# Patient Record
Sex: FEMALE | Race: BLACK OR AFRICAN AMERICAN | NOT HISPANIC OR LATINO | Employment: STUDENT | URBAN - METROPOLITAN AREA
[De-identification: names, ages, dates, MRNs, and addresses within clinical notes are randomized per-mention and may not be internally consistent; named-entity substitution may affect disease eponyms.]

---

## 2024-06-03 ENCOUNTER — OFFICE VISIT (OUTPATIENT)
Dept: URGENT CARE | Facility: CLINIC | Age: 19
End: 2024-06-03
Payer: COMMERCIAL

## 2024-06-03 DIAGNOSIS — Z13.0 ENCOUNTER FOR SICKLE-CELL SCREENING: Primary | ICD-10-CM

## 2024-06-03 PROCEDURE — 99499 UNLISTED E&M SERVICE: CPT | Mod: S$GLB,,, | Performed by: NURSE PRACTITIONER

## 2024-06-03 PROCEDURE — 85660 RBC SICKLE CELL TEST: CPT | Performed by: NURSE PRACTITIONER

## 2024-06-03 NOTE — PROGRESS NOTES
Patient is here for sickle cell screening. 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

## 2024-06-04 LAB — HGB S BLD QL SOLY: NEGATIVE

## 2024-06-06 ENCOUNTER — TELEPHONE (OUTPATIENT)
Dept: URGENT CARE | Facility: CLINIC | Age: 19
End: 2024-06-06
Payer: COMMERCIAL

## 2024-06-06 NOTE — TELEPHONE ENCOUNTER
Informed student her Sickle Cell lab is NEG.   has come to  lab results and attached to [employer] forms.  Student verbalized understanding.  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

## 2024-10-15 ENCOUNTER — OFFICE VISIT (OUTPATIENT)
Dept: SPORTS MEDICINE | Facility: CLINIC | Age: 19
End: 2024-10-15
Payer: COMMERCIAL

## 2024-10-15 ENCOUNTER — ATHLETIC TRAINING SESSION (OUTPATIENT)
Dept: SPORTS MEDICINE | Facility: CLINIC | Age: 19
End: 2024-10-15
Payer: COMMERCIAL

## 2024-10-15 VITALS
WEIGHT: 150 LBS | DIASTOLIC BLOOD PRESSURE: 72 MMHG | BODY MASS INDEX: 26.58 KG/M2 | HEIGHT: 63 IN | SYSTOLIC BLOOD PRESSURE: 122 MMHG

## 2024-10-15 DIAGNOSIS — S06.0X0A CONCUSSION WITHOUT LOSS OF CONSCIOUSNESS, INITIAL ENCOUNTER: Primary | ICD-10-CM

## 2024-10-15 DIAGNOSIS — S09.90XA MINOR HEAD INJURY WITHOUT LOSS OF CONSCIOUSNESS, INITIAL ENCOUNTER: Primary | ICD-10-CM

## 2024-10-15 PROCEDURE — 99999 PR PBB SHADOW E&M-EST. PATIENT-LVL III: CPT | Mod: PBBFAC,,, | Performed by: ORTHOPAEDIC SURGERY

## 2024-10-15 PROCEDURE — 99204 OFFICE O/P NEW MOD 45 MIN: CPT | Mod: 25,S$GLB,, | Performed by: ORTHOPAEDIC SURGERY

## 2024-10-15 PROCEDURE — 96132 NRPSYC TST EVAL PHYS/QHP 1ST: CPT | Mod: S$GLB,,, | Performed by: ORTHOPAEDIC SURGERY

## 2024-10-15 NOTE — PROGRESS NOTES
"Reason for Encounter New Injury    Subjective:       Chief Complaint: Dipti Saunders is a 19 y.o. female student at [employer] who had concerns including Concussion w/o Loc.    Dipti was practicing and went head to head with another player. She did not lose consciousness. She took a minute on the floor and got up on her own. She was alert and oriented. She did not go back into practice. A symptom scale and SCAT5 were done after practice.     Handedness: right-handed  Sport played: basketball      Level: college                ROS              Objective:       General: Dipti is well-developed, well-nourished, appears stated age, in no acute distress, alert and oriented to time, place and person.     AT Session          Assessment:     Status: O - Out    Date Seen:  10/15/2024    Date of Injury:  10/15/2024    Date Out:  10/15/2024    Date Cleared:  n/a        Treatment/Rehab/Maintenance:     Date of Evaluation: 10/15/2024    : Alyssa Reyes    Date of Concussion: 10/15/2024    HPI: Dipti Saunders is being seen in the athletic training room for concussion testing. A SCAT was performed in the ATR and will be uploaded.      Symptom Evaluation  0-6   Headache 2   "Pressure in head" 2   Neck pain  0   Nausea or vomiting 1   Dizziness 4   Blurred vision 0   Balance problems 0   Sensitivity to light 1   Sensitivity to noise  0   Feeling slowed down 2   Feeling like "in a fog" 3   "Don't feel right" 2   Difficulty concentrating 0   Difficulty remembering  0   Fatigue or low energy 1   Confusion  1   Drowsiness 1   More emotional 0   Irritability  0   Sadness 0   Nervous or Anxious 0   Trouble falling asleep 0         Total # of symptoms 11/22   Symptom severity score 20/132       Modified Balance Error Scoring System (mBESS) testing:    Non-dominant foot: left   Testing surface: Hard floor, shoes on        Number of Errors   Double Leg Stance 0   Single Leg Stance 1   Tandem Stance 1   Total Errors 2 "         Assessment:  Concussion      Plan:  Referral        Plan:       1. Referral  2. Physician Referral: yes  3. ED Referral:no  4. Parent/Guardian Notified: No  5. All questions were answered, ath. will contact me for questions or concerns in  the interim.  6.         Eligible to use School Insurance: Yes

## 2024-10-15 NOTE — PROGRESS NOTES
"Subjective:     Dipti, a 19 y.o. female is here today for evaluation of a closed head injury. DOI: 10/15/2024. No LOC from event. 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 She states she collided with a male practice player hitting her forehead against his head. She admits to immediately feeling dizzy and appreciating a headache affecting where she hit her head. She states she is feeling 90% of her normal self.     School / grade: [employer] / [employer]  Sport: Basketball  Position: PG  Dominant hand: Right  How many concussions have you have in the past? 0  When was your most recent concussion & how long was recovery? NA  Have you ever been hospitalized or had medical imaging done for a head injury? NA  Have you ever been diagnosed with headaches or migraines? No  Do you have a learning disability / dyslexia? No  Do you have ADD/ADHD? No  Have you been diagnosed with depression, anxiety or other psychiatric disorder? No  Do you have a history of motion sickness? No  Do you have a history of syncope? No  Do you have a history of epilepsy/seizures? No  Do you wear glasses or contacts? No   If so, when was your last vision exam? NA  Have you taken a baseline ImPACT examination? Yes      Symptom Evaluation  0-6   Headache 1   "Pressure in head" 1   Neck pain  0   Nausea or vomiting 0   Dizziness 2   Blurred vision 0   Balance problems 0   Sensitivity to light 0   Sensitivity to noise  0   Feeling slowed down 0   Feeling like "in a fog" 0   "Don't feel right" 1   Difficulty concentrating 0   Difficulty remembering  0   Fatigue or low energy 0   Confusion  0   Drowsiness 0   More emotional 0   Irritability  0   Sadness 0   Nervous or Anxious 0   Trouble falling asleep 0         Total # of symptoms 4/22   Symptom severity score 5/132     HPI template based on:  1) Consensus statement on concussion in sport--the 5th international conference on concussion in sport held " "in [place], October 2016  2) Sport concussion assessment tool--5th edition    PAST MEDICAL HISTORY:  History reviewed. No pertinent past medical history.    SURGICAL HISTORY:  History reviewed. No pertinent surgical history.    FAMILY HISTORY:  No family history on file.    SOCIAL HISTORY:   reports that she has never smoked. She has never used smokeless tobacco. No history on file for alcohol use and drug use.    MEDICATIONS:  No current outpatient medications on file.    ALLERGIES:  Review of patient's allergies indicates:   Allergen Reactions    Nuts [tree nut]        Objective:     PHYSICAL EXAMINATION:  /72   Ht 5' 3" (1.6 m)   Wt 68 kg (150 lb)   BMI 26.57 kg/m²   Vitals signs and nursing note have been reviewed.  General: In no acute distress, well developed, well nourished, no diaphoresis  Eyes: no eye redness or discharge  HENT: normocephalic and atraumatic, neck supple, trachea midline, no nasal discharge, no external ear redness or discharge. No evidence of dirk orbital raccoon sign to suggest orbital fracture or mastoid process juarez sign to suggest basilar skull fracture on observation. No significant pain with cranial compression to suggest skull fracture upon palpation.   Cardiovascular: 2+ and symmetric radial and DP pulses bilaterally, no LE edema  Lungs: respirations non-labored, no conversational dyspnea   Abd: non-distended, no rigidity  Skin: No rashes, warm and dry  Psychiatric: cooperative, pleasant, mood and affect appropriate for age    NEURO EXAM:  Sensation to light touch intact for UE and LE dermatomes  CN II-XII intact suggesting no intracranial hemorrhage  Upper limb and lower limb coordination: normal  Finger-to-nose testing: appropriate      DTR's                          1. Biceps (C5)   2+/4  2. Brachioradialis (C6) 2+/4  3. Triceps (C7)  2+/4  4. Patella (L4)   2+/4  5. Achilles (L5)  2+/4    Strength Testing: ('*' = with pain)           Upper Extremity  Deltoid        "                             5/5 B/L  Biceps               5/5 B/L  Triceps               5/5 B/L  Wrist Flexion   5/5 B/L  Wrist Extension  5/5 B/L      5/5 B/L  Finger ABduction  5/5 B/L  Finger ABduction  5/5 B/L  EPL (Ext. pollicis longus) 5/5 B/L  Pinch Mechanism  5/5 B/L    Lower Extremity  Hip Flexion   5/5 B/L  Hamstrings   5/5 B/L  Quadraceps              5/5 B/L  Ankle Dorsiflexion  5/5 B/L  Ankle Plantarflexion  5/5 B/L  Ankle Inversion  5/5 B/L  Ankle Eversion  5/5 B/L  EHL (Ext. hollicis longus) 5/5 B/L       Special Tests:                          Spurling's  Negative B/L  Seated SLR  Negative B/L    Modified Balance Error Scoring System (mBESS) testing:    Non-dominant foot: left   Testing surface: Hard floor, shoes on   Number of Errors   Double Leg Stance 0     Single Leg Stance 0     Tandem Stance 0     Total Errors 0       Vestibular/Ocular-Motor Screening (VOMS) Testing:   Headache Dizziness Nausea Fogginess Comments   Symptom severity prior to test 3   1   0   0   No data recorded   VOM Test        Smooth Pursuits 3   1   0   0   No data recorded   Saccades - Horizontal 3   1   0   0   No data recorded   Saccades - Vertical 3   1   0   0   No data recorded   Congergence 3   1   0   0   Measurements (cm):    3 cm, 3 cm, 3 cm     VOR - Horizontal 3   1   0   0   No data recorded   VOR - Vertical 3   1   0   0   No data recorded   Visual Motion Sensitivity Test 3   1   0   0   No data recorded     MUSCULOSKELETAL EXAM:    Posture:  Upright  Neck examination:  Range of motion: Normal  Tenderness: None    Assessment:     Encounter Diagnosis   Name Primary?    Minor head injury without loss of consciousness, initial encounter Yes     Minor head injury w/out loss of consciousness  No evidence of myelopathy / spinal cord pathology  No evidence of focal neurologic deficit  No evidence of skull fracture    Plan:     1) Reassuring evaluation, minimal symptoms present.    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 She endorses immediately feeling dizzy, and headache at the point of impact. She states she is currently feeling 90% of her normal self and feels as though she could have continued to play.     - Education provided on concussions being evolving injuries. We need to monitor for the next 24-48 hours to ensure that symptoms do not worsen. Her symptoms did not worsen with ImPACT testing which is also reassuring that she did not suffer a concussion.     Yes (+) or No (-) Comments   Neuropsychological testing     Administered, reviewed, and shared with the patient (and family, if present) at this visit. + 60 MINUTES FOR: The examination component, including combining data from different sources, interpreting test results and clinical data, decision-making, providing a plan of treatment report, as well as providing interactive feedback with the patient and family members or caregivers    Mental activity     School attendance allowed? +    w/ concussion accommodations? -    Social activity     In person, telephone, and text interactions limited? +    Physical activity (e.g. sports, work)     sports participation prohibited? -    Clinic contact w/  today to discuss plan? + School: [employer]  : [employer]       2) Education:   - Education provided on the diagnosis of concussion. We reviewed the signs and symptoms of concussion, current knowledge on concussions, and the importance of brain and physical rest until symptom resolution. Once symptoms are improving/improved, a progressive return to activity under daily guidance is initiated. We discussed second impact syndrome, that all concussions are significant, and that we cannot predict when one will result in residual symptoms or the development of sequelae later in life. We also reviewed that concussions also often are a  whiplash event that can have mechanical head, neck, and upper back symptoms that may improve/resolve with osteopathic manipulative treatment. I advised that concussion is a clinical diagnosis and we take into account many factors including mechanism of injury, symptoms and symptom severity, and physical examination focusing on the neurologic and visual symptoms.    3) Follow up in 1 week or sooner for re evaluation should patient's symptoms COMPLETELY resolve  -  upon successful completion of RTP protocol per SCAT5, pt/family/AT will contact the clinic, and the clinic will document successful completion  - if any Step of RTP protocol per SCAT5 is failed, pt/family/AT will immediately alert the clinic for further evaluation    - Should symptoms acutely worsen, or should new symptoms arise, the patient should call the clinic, but if unavailable immediately present to the Emergency Department for further evaluation.    4) Patient and parent/family/ATC agreeable to today's plan and all questions were answered      This note is dictated using the M*Modal Fluency Direct word recognition program. There are word recognition mistakes that are occasionally missed on review.

## 2024-10-19 ENCOUNTER — ATHLETIC TRAINING SESSION (OUTPATIENT)
Dept: SPORTS MEDICINE | Facility: CLINIC | Age: 19
End: 2024-10-19
Payer: COMMERCIAL

## 2024-10-19 DIAGNOSIS — S09.90XA MINOR HEAD INJURY WITHOUT LOSS OF CONSCIOUSNESS, INITIAL ENCOUNTER: Primary | ICD-10-CM

## 2024-10-19 NOTE — PROGRESS NOTES
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    Dipti returns to the ATR to report her symptoms. She states that she is feeling fine.     Handedness: right-handed  Sport played: basketball      Level: college            Head Injury        ROS              Objective:       General: Dipti is well-developed, well-nourished, appears stated age, in no acute distress, alert and oriented to time, place and person.     AT Session          Assessment:     Status: L - Limited    Date Seen:  10/16/2024    Date of Injury:  10/15/2024    Date Out:  10/15/2024    Date Cleared:  n/a        Treatment/Rehab/Maintenance:     Date of Evaluation: 10/17/2024    : Alyssa Reyes    Date of Head Injury: 10/15/2024    HPI: Dipti Saunders is being seen in the athletic training room for concussion testing. She is doing better. This is her prepractice symptom score. She participated in a full contact practice without any issues. This is her prepractice symptom score.    Symptom Evaluation  0-6   Headache 2   "Pressure in head" 1   Neck pain  0   Nausea or vomiting 0   Dizziness 0   Blurred vision 0   Balance problems 0   Sensitivity to light 1   Sensitivity to noise  2   Feeling slowed down 0   Feeling like "in a fog" 0   "Don't feel right" 1   Difficulty concentrating 0   Difficulty remembering  0   Fatigue or low energy 0   Confusion  0   Drowsiness 0   More emotional 0   Irritability  0   Sadness 1   Nervous or Anxious 0   Trouble falling asleep 2         Total # of symptoms 7/22   Symptom severity score 10/132     Assessment:  Minor head injury, no conussion      Plan:  Continue full practice. Report back if any symptoms reappear or worsen. Otherwise cleared to exert.    Date of Evaluation: 10/16/2024    : Alyssa Reyes    Date of Head Injury: 10/15/2024    HPI: Dipti Saunders is being seen " "in the athletic training room for concussion testing. She is doing better. This is her prepractice symptom score. She performed 20-30 mins on the bike and shooting, non-contact drills.       Symptom Evaluation  0-6   Headache 3   "Pressure in head" 2   Neck pain  0   Nausea or vomiting 0   Dizziness 0   Blurred vision 0   Balance problems 0   Sensitivity to light 1   Sensitivity to noise  3   Feeling slowed down 0   Feeling like "in a fog" 0   "Don't feel right" 2   Difficulty concentrating 0   Difficulty remembering  0   Fatigue or low energy 0   Confusion  0   Drowsiness 0   More emotional 0   Irritability  0   Sadness 0   Nervous or Anxious 0   Trouble falling asleep 3         Total # of symptoms 6/22   Symptom severity score 14/132     Post-practice symptom score -       Symptom Evaluation  0-6   Headache 3   "Pressure in head" 1   Neck pain  0   Nausea or vomiting 0   Dizziness 0   Blurred vision 0   Balance problems 0   Sensitivity to light 1   Sensitivity to noise  3   Feeling slowed down 0   Feeling like "in a fog" 0   "Don't feel right" 2   Difficulty concentrating 0   Difficulty remembering  0   Fatigue or low energy 0   Confusion  0   Drowsiness 0   More emotional 0   Irritability  1   Sadness 1   Nervous or Anxious 0   Trouble falling asleep 3         Total # of symptoms 8/22   Symptom severity score 15/132         Assessment:  Minor head injury      Plan:  Continue to full practice tomorrow if feeling better.        Plan:       1. Symptom scores, follow up with Dr. Lynn and progress as tolerated.  2. Physician Referral: no  3. ED Referral:no  4. Parent/Guardian Notified: No  5. All questions were answered, ath. will contact me for questions or concerns in  the interim.  6.         Eligible to use School Insurance: Yes                  "

## 2024-11-14 ENCOUNTER — ATHLETIC TRAINING SESSION (OUTPATIENT)
Dept: SPORTS MEDICINE | Facility: CLINIC | Age: 19
End: 2024-11-14
Payer: COMMERCIAL

## 2024-11-14 DIAGNOSIS — Z00.00 HEALTHCARE MAINTENANCE: Primary | ICD-10-CM

## 2024-11-14 NOTE — PROGRESS NOTES
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    During the game vs David Sanchez.11/12/2024, on a drive to the basket as Dipti went up for the lay-up the opponent went to block her shot. On the block, the opponent was able to force her back into hyperextension. She finished her possessions until she was sub out of the game. During halftime is when she report the injury to me and stated that it was just a bad pain in her lower back. For treatments, I actively stretch her hamstrings and lower back and did a light massage o the lower back while she was in a stretching position.  She hasn't stated any problem about it since that day. She did finish the game.    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 The current episode started in the past 7 days. The problem occurs rarely. The problem has been resolved. The treatment provided significant relief.       ROS              Objective:       General: Dipti is well-developed, well-nourished, appears stated age, in no acute distress, alert and oriented to time, place and person.     AT Session          Assessment:     Status: F - Full Participation    Date Seen:  11/12/2024    Date of Injury:  11/12/2024    Date Out:  11/12/2024    Date Cleared:  11/12/2024        Treatment/Rehab/Maintenance:   Dipti completed:    [x]  INJURY TREATMENT   []  MAINTENANCE  DATE OF SERVICE: 11/12/2024  INJURY/CONDITON: Lower back    Dipti received the selected modalities after being cleared for contradictions.  Dipti received education on potenital side effects of the selected modalities and agreed to treatment.      Massage Duration  (Mins) Add. Tx Parameters / Comment   []Massage - IASTM     []Massage - Scar Tissue     []Massage - Self Administered     [x]Massage -  Therapeutic 5 W/stretch   []Myofascial Release        THERAPEUTIC EXERCISES:    Stretching Cardio Rehab Other   []Stretching - Active []Cardio - Bike []Rehab - Ankle/Foot []Agility []PNF   []Stretching - Dynamic []Cardio - Elliptical []Rehab - Knee []Balance []ROM - Active   [x]Stretching - Passive []Cardio - Jog/Run []Rehab - Hip []Blood Flow Restriction []ROM - Passive   []Stretching - PNF []Cardio - Treadmill []Rehab - Wrist/Hand []Foam Roller []RTP - Concussion Protocol   []Stretching - Static []Cardio - Upper Body Ergometer []Rehab - Elbow []Functional Exercises []RTP - Sport Specific    []Cardio - Walk []Rehab - Shoulder []Joint Mobilization []Strengthening Exercises     []Rehab - Neck/Spine []Manual Therapy []Other:     []Rehab - Back []Plyometric Exercises      []Rehab - Other         Plan:       1. Check back in with me and get treatment as needed.  2. Physician Referral: no  3. ED Referral:no  4. Parent/Guardian Notified: No  5. All questions were answered, ath. will contact me for questions or concerns in  the interim.  6.         Eligible to use School Insurance: Yes

## 2024-11-30 ENCOUNTER — ATHLETIC TRAINING SESSION (OUTPATIENT)
Dept: SPORTS MEDICINE | Facility: CLINIC | Age: 19
End: 2024-11-30
Payer: COMMERCIAL

## 2024-11-30 DIAGNOSIS — Z00.00 HEALTHCARE MAINTENANCE: Primary | ICD-10-CM

## 2024-12-01 NOTE — PROGRESS NOTES
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    She hasn't complain about it since 11/19/2024 but comes to get treatment on it every so often especially on or before game days.     During Practice on 11/17/2024, as they were doing a lay-up drill, Gomez drove to the goal and when she went up for the shoot, she barely got off the ground and immediately fell to the ground. She walked it off and jumped right back in practice. She finish practice without a problem. On 11/18/2024 she reports in the training room complaining of R ankle pain, She could barely walk on it and stated that pain scale is 7/10. I taped her up for practice but she only did shooting drills and walk-through and after practice she came and received treatment on it. On 11/19/224 she reports to morning treatments before the game feeling better that yesterday and ready to go full out. She played in the game without a problem..     Handedness: right-handed  Sport played: basketball      Level: college      Position:gaurd      Injury  This is a new problem. The current episode started 1 to 4 weeks ago. The problem occurs every several days. The problem has been gradually improving. The symptoms are aggravated by walking. She has tried ice and NSAIDs for the symptoms. The treatment provided moderate relief.   Pain  This is a new problem. The current episode started 1 to 4 weeks ago. The problem occurs every several days. The problem has been gradually improving. The symptoms are aggravated by walking. She has tried ice for the symptoms. The treatment provided moderate relief.       ROS              Objective:       General: Dipti is well-developed, well-nourished, appears stated age, in no acute distress, alert and oriented to time, place and person.     AT Session          Assessment:     Status: F - Full  Participation    Date Seen:  11/17/2024    Date of Injury:  11/17/2024    Date Out:  n/a    Date Cleared:  11/17/2024        Treatment/Rehab/Maintenance:   Dipti completed:    [x]  INJURY TREATMENT   []  MAINTENANCE  DATE OF SERVICE: 11/19/2024  INJURY/CONDITON: R ankle     Dipti received the selected modalities after being cleared for contradictions.  Dipti received education on potenital side effects of the selected modalities and agreed to treatment.    Ultrasound Duty Cycle   (%) Freq.  (Mhz) Intensity   (w/cm2) Duration  (Mins) Add. Tx Parameters / Comment   []Combo        []Phonophoresis     Meds:   [x]Ultrasound  50 3.3 1.0 7 W/ biofreeze & cajun cream   []Ultrasound and E-Stim            Miscellaneous Add. Tx Parameters / Comment   []Compression Wrap    []Support Wrap    [x]Taping - Preventative L ankle   [x]Taping - Injured Part R ankle   []Wound Care    []Other:      Comment:         [x]  INJURY TREATMENT   []  MAINTENANCE  DATE OF SERVICE: 11/18/2024  INJURY/CONDITON: R ankle    MODALITIES:    Cryotherapy / Thermotherapy Duration  (Mins) Add. Tx Parameters / Comment   []Cold Tub / Whirlpool (50-60 F)     []Contrast Bath (105-110 F & 50-65 F)     [x]Game Ready 20 W/e-stim   []Hot Pack     []Hot Tub / Whirlpool ( F)     []Ice Massage     []Ice Pack     []Paraffin Wax (126-130 F)     []Vapocoolant Spray        Comment:       Electrotherapy Waveform   (AC/DC) Modulation (Cont./Interrupted/Surged) Intensity   (V) Pulse Width/Dur.  (uS) Pulse Rate/Freq.  (Hz, PPS or CPS) Duration  (Mins) Add. Tx Parameters / Comment   []Combo          [x]E-Stim - IFC DC Cont 17   20 W/ gameready   []E-Stim - Premod          []E-Stim - [place]ese          []E-Stim - TENS          []E-Stim - Other          []Iontophoresis        Meds:           Plan:       1. Rest and come from treatments as needed.  2. Physician Referral: no  3. ED Referral:no  4. Parent/Guardian Notified: No  5. All questions were answered, ath.  will contact me for questions or concerns in  the interim.  6.         Eligible to use School Insurance: Yes

## 2024-12-12 ENCOUNTER — OFFICE VISIT (OUTPATIENT)
Dept: URGENT CARE | Facility: CLINIC | Age: 19
End: 2024-12-12
Payer: COMMERCIAL

## 2024-12-12 VITALS
SYSTOLIC BLOOD PRESSURE: 113 MMHG | HEART RATE: 87 BPM | WEIGHT: 150 LBS | OXYGEN SATURATION: 97 % | TEMPERATURE: 100 F | BODY MASS INDEX: 26.58 KG/M2 | DIASTOLIC BLOOD PRESSURE: 75 MMHG | HEIGHT: 63 IN | RESPIRATION RATE: 18 BRPM

## 2024-12-12 DIAGNOSIS — J06.9 VIRAL URI WITH COUGH: Primary | ICD-10-CM

## 2024-12-12 DIAGNOSIS — R05.9 COUGH, UNSPECIFIED TYPE: ICD-10-CM

## 2024-12-12 LAB
CTP QC/QA: YES
SARS-COV-2 RDRP RESP QL NAA+PROBE: NEGATIVE

## 2024-12-12 NOTE — PROGRESS NOTES
"Subjective:      Patient ID: Dipti Saunders is a 19 y.o. female.    Vitals:  height is 5' 3" (1.6 m) and weight is 68 kg (150 lb). Her oral temperature is 99.5 °F (37.5 °C). Her blood pressure is 113/75 and her pulse is 87. Her respiration is 18 and oxygen saturation is 97%.     Chief Complaint: Cough    (Student)    Cough  This is a new problem. The current episode started yesterday. The problem has been gradually worsening. The problem occurs constantly. The cough is Productive of sputum. Associated symptoms include nasal congestion and postnasal drip. Pertinent negatives include no chest pain, chills, ear congestion, ear pain, fever, headaches, heartburn, hemoptysis, myalgias, rash, rhinorrhea, sore throat, shortness of breath, sweats, weight loss or wheezing. Nothing aggravates the symptoms. The treatment provided no relief. There is no history of asthma, bronchiectasis, bronchitis, COPD, emphysema, environmental allergies or pneumonia.       Constitution: Negative for chills and fever.   HENT:  Positive for postnasal drip. Negative for ear pain and sore throat.    Cardiovascular:  Negative for chest pain.   Respiratory:  Positive for cough. Negative for bloody sputum, shortness of breath and wheezing.    Gastrointestinal:  Negative for heartburn.   Musculoskeletal:  Negative for muscle ache.   Skin:  Negative for rash.   Allergic/Immunologic: Negative for environmental allergies.   Neurological:  Negative for headaches.      Objective:     Physical Exam   Constitutional: She is oriented to person, place, and time.  Non-toxic appearance. She does not appear ill. No distress.   HENT:   Head: Normocephalic and atraumatic.   Ears:   Right Ear: Tympanic membrane normal.   Left Ear: Tympanic membrane normal.   Nose: Mucosal edema (bilateral purplish swollen turbinates) present. No rhinorrhea, sinus tenderness or congestion.   Mouth/Throat: Mucous membranes are moist. Oropharynx is clear.   Eyes: Conjunctivae are " normal. Pupils are equal, round, and reactive to light. Extraocular movement intact   Neck: Neck supple.   Cardiovascular: Normal rate, regular rhythm, normal heart sounds and normal pulses.   Pulmonary/Chest: Effort normal and breath sounds normal. No respiratory distress. She has no wheezes.   Abdominal: Normal appearance.   Musculoskeletal: Normal range of motion.         General: Normal range of motion.   Neurological: no focal deficit. She is alert and oriented to person, place, and time.   Skin: Skin is warm, dry and not diaphoretic.   Psychiatric: Her behavior is normal. Mood normal.   Nursing note and vitals reviewed.  Results for orders placed or performed in visit on 12/12/24   POCT COVID-19 Rapid Screening    Collection Time: 12/12/24 12:54 PM   Result Value Ref Range    POC Rapid COVID Negative Negative     Acceptable Yes        Assessment:     1. Viral URI with cough    2. Cough, unspecified type        Plan:       Viral URI with cough    Cough, unspecified type  -     POCT COVID-19 Rapid Screening

## 2024-12-12 NOTE — PATIENT INSTRUCTIONS
Drink plenty of fluids  Rest.   If you have fever you may return to work or school when you are fever free for 24 hours without using fever reducing medication.  Elevate head of bed when sleeping, use a humidifier (or a steamy shower) and use normal saline in the nasal passages to help with nasal congestion and cough.   For sore throat- avoid acidic/spicy foods   Wash hands frequently or use hand     Medications:  Fever and pain Ibuprofen (Advil or Motrin) and/or Acetaminophen (Tylenol) please read the packages for instructions  Cough and Congestion Guaifenesin (Mucinex) is an expectorant, Dextromethropan (DM) is a cough suppressant. Flonase (Fluticasone) nasal spray. One set in the morning and one set at night.  Sore throat  Cepacol lozenges,  warm salt water gargles  Runny nose/Allergy symptoms  Allegra      The cough may linger for weeks.  Cough is our bodies defense mechanism to move mucus around to prevent us from getting pneumonia.  We can't totally take the cough away.       Follow up if:  Symptoms not improved in 14 days  Fever for longer than 3 days  Cough last longer than 10 days  Increased tiredness or weakness  If you are having difficulty breathing.  (If severe call 911 or go to nearest ER)       STILL HAS NOT GOTTEN A RENTAL CAR, SHE IS # 5 ON LIST TODAY SO SHE MOVED HER APPT BACK TO THIS AFTERNOON

## 2024-12-13 ENCOUNTER — HOSPITAL ENCOUNTER (EMERGENCY)
Facility: HOSPITAL | Age: 19
Discharge: HOME | End: 2024-12-14
Attending: STUDENT IN AN ORGANIZED HEALTH CARE EDUCATION/TRAINING PROGRAM
Payer: COMMERCIAL

## 2024-12-13 DIAGNOSIS — J06.9 VIRAL URI WITH COUGH: Primary | ICD-10-CM

## 2024-12-13 LAB
B-HCG UR QL: NEGATIVE
CTP QC/QA: YES
INFLUENZA A, MOLECULAR: NEGATIVE
INFLUENZA B, MOLECULAR: NEGATIVE
SARS-COV-2 RDRP RESP QL NAA+PROBE: NEGATIVE
SPECIMEN SOURCE: NORMAL

## 2024-12-13 PROCEDURE — 87502 INFLUENZA DNA AMP PROBE: CPT | Performed by: PHYSICIAN ASSISTANT

## 2024-12-13 PROCEDURE — 87635 SARS-COV-2 COVID-19 AMP PRB: CPT | Performed by: PHYSICIAN ASSISTANT

## 2024-12-13 PROCEDURE — 25000003 PHARM REV CODE 250: Performed by: PHYSICIAN ASSISTANT

## 2024-12-13 PROCEDURE — 99284 EMERGENCY DEPT VISIT MOD MDM: CPT

## 2024-12-13 PROCEDURE — 81025 URINE PREGNANCY TEST: CPT | Performed by: PHYSICIAN ASSISTANT

## 2024-12-13 RX ORDER — BENZONATATE 100 MG/1
100 CAPSULE ORAL 3 TIMES DAILY PRN
Qty: 20 CAPSULE | Refills: 0 | Status: SHIPPED | OUTPATIENT
Start: 2024-12-13 | End: 2024-12-13

## 2024-12-13 RX ORDER — ACETAMINOPHEN 500 MG
1000 TABLET ORAL
Status: COMPLETED | OUTPATIENT
Start: 2024-12-13 | End: 2024-12-13

## 2024-12-13 RX ORDER — OXYMETAZOLINE HCL 0.05 %
1 SPRAY, NON-AEROSOL (ML) NASAL
Status: COMPLETED | OUTPATIENT
Start: 2024-12-14 | End: 2024-12-13

## 2024-12-13 RX ORDER — PSEUDOEPHEDRINE HCL 120 MG/1
120 TABLET, FILM COATED, EXTENDED RELEASE ORAL 2 TIMES DAILY PRN
Qty: 20 TABLET | Refills: 0 | Status: SHIPPED | OUTPATIENT
Start: 2024-12-13 | End: 2024-12-23

## 2024-12-13 RX ORDER — ONDANSETRON 4 MG/1
4 TABLET, ORALLY DISINTEGRATING ORAL EVERY 6 HOURS PRN
Qty: 15 TABLET | Refills: 0 | Status: SHIPPED | OUTPATIENT
Start: 2024-12-13

## 2024-12-13 RX ORDER — ONDANSETRON 4 MG/1
4 TABLET, ORALLY DISINTEGRATING ORAL EVERY 6 HOURS PRN
Qty: 15 TABLET | Refills: 0 | Status: SHIPPED | OUTPATIENT
Start: 2024-12-13 | End: 2024-12-13

## 2024-12-13 RX ORDER — BENZONATATE 100 MG/1
100 CAPSULE ORAL
Status: COMPLETED | OUTPATIENT
Start: 2024-12-13 | End: 2024-12-13

## 2024-12-13 RX ORDER — BENZONATATE 100 MG/1
100 CAPSULE ORAL 3 TIMES DAILY PRN
Qty: 20 CAPSULE | Refills: 0 | Status: SHIPPED | OUTPATIENT
Start: 2024-12-13 | End: 2024-12-23

## 2024-12-13 RX ORDER — IBUPROFEN 600 MG/1
600 TABLET ORAL
Status: COMPLETED | OUTPATIENT
Start: 2024-12-13 | End: 2024-12-13

## 2024-12-13 RX ADMIN — ACETAMINOPHEN 1000 MG: 500 TABLET ORAL at 11:12

## 2024-12-13 RX ADMIN — IBUPROFEN 600 MG: 600 TABLET, FILM COATED ORAL at 11:12

## 2024-12-13 RX ADMIN — DIPHENHYDRAMINE HYDROCHLORIDE 10 ML: 25 SOLUTION ORAL at 11:12

## 2024-12-13 RX ADMIN — Medication 1 SPRAY: at 11:12

## 2024-12-13 RX ADMIN — BENZONATATE 100 MG: 100 CAPSULE ORAL at 11:12

## 2024-12-13 NOTE — Clinical Note
"Dipti Victoraston Saunders was seen and treated in our emergency department on 12/13/2024.  She may return to gym class or sports on 12/16/2024.      If you have any questions or concerns, please don't hesitate to call.      Rukhsana Man PA-C"

## 2024-12-14 VITALS
RESPIRATION RATE: 18 BRPM | BODY MASS INDEX: 26.46 KG/M2 | WEIGHT: 155 LBS | OXYGEN SATURATION: 99 % | HEIGHT: 64 IN | SYSTOLIC BLOOD PRESSURE: 121 MMHG | HEART RATE: 71 BPM | TEMPERATURE: 99 F | DIASTOLIC BLOOD PRESSURE: 71 MMHG

## 2024-12-14 PROCEDURE — 25000003 PHARM REV CODE 250: Performed by: PHYSICIAN ASSISTANT

## 2024-12-14 NOTE — ED NOTES
Patient identifiers for Dipti Saunders 19 y.o. female checked and correct.  Chief Complaint   Patient presents with    Influenza     Flu like symptoms, body aches, headache cough     History reviewed. No pertinent past medical history.  Allergies reported:   Review of patient's allergies indicates:   Allergen Reactions    Nuts [tree nut]          HEENT: Denies vision changes. Denies ear drainage or hearing loss. No c/o nasal drainage. Denies dysphagia or voice changes. +headache  Appearance: Pt awake, alert & oriented to person, place & time. Pt in no acute distress at present time. Pt is clean and well groomed with clothes appropriately fastened.   Skin: Skin warm, dry & intact. Color consistent with ethnicity. Mucous membranes moist. No breakdown or brusing noted.   Musculoskeletal: Patient moving all extremities well, no obvious swelling or deformities noted. +body aches   Respiratory: Respirations spontaneous, even, and non-labored. Visible chest rise noted. Airway is open and patent. No accessory muscle use noted. +cough  Neurologic: Sensation is intact. Speech is clear and appropriate. Eyes open spontaneously, behavior appropriate to situation, follows commands, facial expression symmetrical, bilateral hand grasp equal and even, purposeful motor response noted.  Cardiac: All peripheral pulses present. No Bilateral lower extremity edema. Cap refill is <3 seconds.  Abdomen: Abdomen soft, non distended, non tender to palpation.   : Pt voids independently, denies dysuria, hematuria, frequency.

## 2024-12-14 NOTE — ED PROVIDER NOTES
Encounter Date: 12/13/2024       History     Chief Complaint   Patient presents with    Influenza     Flu like symptoms, body aches, headache cough     Healthy 19-year-old female presents for cough and congestion for about 3 days.  She reports associated fatigue, myalgias and sore throat.  She denies vomiting or shortness of breath.  Her friend is sick with similar symptoms.  No recent international travel.      Review of patient's allergies indicates:   Allergen Reactions    Nuts [tree nut]      History reviewed. No pertinent past medical history.  History reviewed. No pertinent surgical history.  No family history on file.  Social History     Tobacco Use    Smoking status: Never    Smokeless tobacco: Never     Review of Systems    Physical Exam     Initial Vitals [12/13/24 2204]   BP Pulse Resp Temp SpO2   117/79 72 19 99.4 °F (37.4 °C) 99 %      MAP       --         Physical Exam    Nursing note and vitals reviewed.  Constitutional: She appears well-developed and well-nourished.   HENT:   Head: Normocephalic and atraumatic.   Nose: Rhinorrhea present. Mouth/Throat: Uvula is midline, oropharynx is clear and moist and mucous membranes are normal.   Neck: Neck supple.   Normal range of motion.  Cardiovascular:  Normal rate, regular rhythm, normal heart sounds and intact distal pulses.     Exam reveals no gallop and no friction rub.       No murmur heard.  Pulmonary/Chest: Breath sounds normal. No respiratory distress. She has no wheezes. She has no rhonchi. She has no rales. She exhibits no tenderness.   Musculoskeletal:         General: Normal range of motion.      Cervical back: Normal range of motion and neck supple.     Neurological: She is alert.   Skin: Skin is warm.         ED Course   Procedures  Labs Reviewed   INFLUENZA A & B BY MOLECULAR       Result Value    Influenza A, Molecular Negative      Influenza B, Molecular Negative      Flu A & B Source Nasal swab     SARS-COV-2 RNA AMPLIFICATION, QUAL     SARS-CoV-2 RNA, Amplification, Qual Negative     HEPATITIS C ANTIBODY   HIV 1 / 2 ANTIBODY   POCT URINE PREGNANCY    POC Preg Test, Ur Negative       Acceptable Yes            Imaging Results    None          Medications   benzonatate capsule 100 mg (has no administration in time range)   ibuprofen tablet 600 mg (has no administration in time range)   acetaminophen tablet 1,000 mg (has no administration in time range)   oxymetazoline 0.05 % nasal spray 1 spray (has no administration in time range)   (Magic mouthwash) 1:1:1 diphenhydrAMINE(Benadryl) 12.5mg/5ml liq, aluminum & magnesium hydroxide-simethicone (Maalox), LIDOcaine viscous 2% (has no administration in time range)     Medical Decision Making  19-year-old female presenting for URI symptoms for several days duration.  Her vitals are normal, she appears uncomfortable but nontoxic.    Differential diagnosis:  COVID-19   Influenza   Viral URI     Will swab for COVID and flu, give medications for symptomatic relief reassess.    COVID and flu tests are negative.  Will discharge with medications for symptomatic relief and instruct to follow up with PCP and return to the ED for worsening symptoms. Stressed the importance of follow-up, strict ED return precautions given.  Patient voiced understanding and is comfortable with discharge.     Amount and/or Complexity of Data Reviewed  Labs: ordered. Decision-making details documented in ED Course.    Risk  OTC drugs.  Prescription drug management.               ED Course as of 12/13/24 2355   Fri Dec 13, 2024   2333 SARS-CoV-2 RNA, Amplification, Qual: Negative [CC]   2339 Influenza A, Molecular: Negative [CC]   2339 Influenza B, Molecular: Negative [CC]   2348 hCG Qualitative, Urine: Negative [CC]      ED Course User Index  [CC] Rukhsana Man PA-C                           Clinical Impression:  Final diagnoses:  [J06.9] Viral URI with cough (Primary)          ED Disposition Condition    Discharge  Stable          ED Prescriptions       Medication Sig Dispense Start Date End Date Auth. Provider    benzonatate (TESSALON) 100 MG capsule  (Status: Discontinued) Take 1 capsule (100 mg total) by mouth 3 (three) times daily as needed for Cough. 20 capsule 12/13/2024 12/13/2024 Rukhsana Man PA-C    pseudoephedrine (SUDAFED 12 HOUR) 120 mg 12 hr tablet Take 1 tablet (120 mg total) by mouth 2 (two) times daily as needed for Congestion. 20 tablet 12/13/2024 12/23/2024 Rukhsana Man PA-C    diphenhydrAMINE-aluminum-magnesium hydroxide-simethicone-LIDOcaine viscous HCl 2% Swish and spit 15 mLs every 4 (four) hours as needed. 1 each 12/13/2024 -- Rukhsana Man PA-C    ondansetron (ZOFRAN-ODT) 4 MG TbDL  (Status: Discontinued) Take 1 tablet (4 mg total) by mouth every 6 (six) hours as needed (Nausea). 15 tablet 12/13/2024 12/13/2024 Rukhsana Man PA-C    benzonatate (TESSALON) 100 MG capsule Take 1 capsule (100 mg total) by mouth 3 (three) times daily as needed for Cough. 20 capsule 12/13/2024 12/23/2024 Rukhsana Man PA-C    ondansetron (ZOFRAN-ODT) 4 MG TbDL Take 1 tablet (4 mg total) by mouth every 6 (six) hours as needed (Nausea). 15 tablet 12/13/2024 -- Rukhsana Man PA-C          Follow-up Information       Follow up With Specialties Details Why Contact [facility] - Family Family Medicine Schedule an appointment as soon as possible for a visit in 1 week  [address] [facility] [address]  [phone]      [facility] - Emergency Dept Emergency Medicine Go to  If symptoms worsen [address] [facility] [address] [phone]             Rukhsana Man PA-C  12/13/24 2058

## 2024-12-14 NOTE — DISCHARGE INSTRUCTIONS
Diagnosis: Viral upper respiratory infection    Your symptoms are due to a virus.  Your COVID and flu tests were negative.  I am prescribing medicine for cough, congestion, nausea and sore throat that you can take as needed. You should take Tylenol as needed for pain up to 3 grams daily which is 6 of the 500 mg extra strength tablets.  Please be aware that many over-the-counter products including NyQuil, TheraFlu contain Tylenol.  Ibuprofen in addition to this, up to 2400 mg daily as needed.    Please schedule an appointment with your primary care doctor for follow-up. If you start to have any new or worsening symptoms, please come back to the emergency department.

## 2024-12-31 ENCOUNTER — ATHLETIC TRAINING SESSION (OUTPATIENT)
Dept: SPORTS MEDICINE | Facility: CLINIC | Age: 19
End: 2024-12-31
Payer: COMMERCIAL

## 2024-12-31 DIAGNOSIS — Z00.00 HEALTHCARE MAINTENANCE: Primary | ICD-10-CM

## 2025-01-01 NOTE — PROGRESS NOTES
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    No complaints or problems since after amelia break. Gets it taped as needed.    She hasn't complain about it since 11/19/2024 but comes to get treatment on it every so often especially on or before game days.     During Practice on 11/17/2024, as they were doing a lay-up drill, Gomez drove to the goal and when she went up for the shoot, she barely got off the ground and immediately fell to the ground. She walked it off and jumped right back in practice. She finish practice without a problem. On 11/18/2024 she reports in the training room complaining of R ankle pain, She could barely walk on it and stated that pain scale is 7/10. I taped her up for practice but she only did shooting drills and walk-through and after practice she came and received treatment on it. On 11/19/224 she reports to morning treatments before the game feeling better that yesterday and ready to go full out. She played in the game without a problem..     Handedness: right-handed  Sport played: basketball      Level: college      Position:gaurd      Injury  This is a new problem. The current episode started 1 to 4 weeks ago. The problem occurs every several days. The problem has been gradually improving. The symptoms are aggravated by walking. She has tried ice and NSAIDs for the symptoms. The treatment provided moderate relief.   Pain  This is a new problem. The current episode started 1 to 4 weeks ago. The problem occurs every several days. The problem has been gradually improving. The symptoms are aggravated by walking. She has tried ice for the symptoms. The treatment provided moderate relief.       ROS              Objective:       General: Dipti is well-developed, well-nourished, appears stated age, in no acute distress, alert and oriented to time, place and  person.     AT Session          Assessment:     Status: F - Full Participation    Date Seen:  11/17/2024    Date of Injury:  11/17/2024    Date Out:  n/a    Date Cleared:  11/17/2024        Treatment/Rehab/Maintenance:   Dipti completed:    [x]  INJURY TREATMENT   []  MAINTENANCE  DATE OF SERVICE: 11/19/2024  INJURY/CONDITON: R ankle     Dipti received the selected modalities after being cleared for contradictions.  Dipti received education on potenital side effects of the selected modalities and agreed to treatment.    Ultrasound Duty Cycle   (%) Freq.  (Mhz) Intensity   (w/cm2) Duration  (Mins) Add. Tx Parameters / Comment   []Combo        []Phonophoresis     Meds:   [x]Ultrasound  50 3.3 1.0 7 W/ biofreeze & cajun cream   []Ultrasound and E-Stim            Miscellaneous Add. Tx Parameters / Comment   []Compression Wrap    []Support Wrap    [x]Taping - Preventative L ankle   [x]Taping - Injured Part R ankle   []Wound Care    []Other:      Comment:         [x]  INJURY TREATMENT   []  MAINTENANCE  DATE OF SERVICE: 11/18/2024  INJURY/CONDITON: R ankle    MODALITIES:    Cryotherapy / Thermotherapy Duration  (Mins) Add. Tx Parameters / Comment   []Cold Tub / Whirlpool (50-60 F)     []Contrast Bath (105-110 F & 50-65 F)     [x]Game Ready 20 W/e-stim   []Hot Pack     []Hot Tub / Whirlpool ( F)     []Ice Massage     []Ice Pack     []Paraffin Wax (126-130 F)     []Vapocoolant Spray        Comment:       Electrotherapy Waveform   (AC/DC) Modulation (Cont./Interrupted/Surged) Intensity   (V) Pulse Width/Dur.  (uS) Pulse Rate/Freq.  (Hz, PPS or CPS) Duration  (Mins) Add. Tx Parameters / Comment   []Combo          [x]E-Stim - IFC DC Cont 17   20 W/ gameready   []E-Stim - Premod          []E-Stim - [place]ian          []E-Stim - TENS          []E-Stim - Other          []Iontophoresis        Meds:           Plan:       1.  treatments as needed.  2. Physician Referral: no  3. ED Referral:no  4. Parent/Guardian  Notified: No  5. All questions were answered, ath. will contact me for questions or concerns in  the interim.  6.         Eligible to use School Insurance: Yes

## 2025-01-24 ENCOUNTER — OFFICE VISIT (OUTPATIENT)
Dept: URGENT CARE | Facility: CLINIC | Age: 20
End: 2025-01-24
Payer: COMMERCIAL

## 2025-01-24 VITALS
WEIGHT: 155 LBS | RESPIRATION RATE: 16 BRPM | OXYGEN SATURATION: 99 % | TEMPERATURE: 98 F | HEIGHT: 64 IN | BODY MASS INDEX: 26.46 KG/M2 | DIASTOLIC BLOOD PRESSURE: 89 MMHG | HEART RATE: 111 BPM | SYSTOLIC BLOOD PRESSURE: 133 MMHG

## 2025-01-24 DIAGNOSIS — T78.40XA ALLERGIC REACTION, INITIAL ENCOUNTER: Primary | ICD-10-CM

## 2025-01-24 PROCEDURE — 99213 OFFICE O/P EST LOW 20 MIN: CPT | Mod: S$GLB,,, | Performed by: FAMILY MEDICINE

## 2025-01-24 RX ORDER — PREDNISONE 50 MG/1
50 TABLET ORAL DAILY
Qty: 4 TABLET | Refills: 0 | Status: SHIPPED | OUTPATIENT
Start: 2025-01-25 | End: 2025-01-29

## 2025-01-24 RX ORDER — PREDNISONE 20 MG/1
80 TABLET ORAL
Status: COMPLETED | OUTPATIENT
Start: 2025-01-24 | End: 2025-01-24

## 2025-01-24 RX ORDER — CETIRIZINE HYDROCHLORIDE 10 MG/1
10 TABLET ORAL DAILY
Qty: 7 TABLET | Refills: 0 | Status: SHIPPED | OUTPATIENT
Start: 2025-01-24 | End: 2025-01-31

## 2025-01-24 RX ORDER — EPINEPHRINE 0.3 MG/.3ML
2 INJECTION SUBCUTANEOUS ONCE
Qty: 0.6 ML | Refills: 0 | Status: SHIPPED | OUTPATIENT
Start: 2025-01-24 | End: 2025-01-24

## 2025-01-24 RX ADMIN — PREDNISONE 80 MG: 20 TABLET ORAL at 11:01

## 2025-01-24 NOTE — PROGRESS NOTES
"Subjective:      Patient ID: Dipti Saunders is a 19 y.o. female.    Vitals:  height is 5' 4" (1.626 m) and weight is 70.3 kg (155 lb). Her oral temperature is 98.1 °F (36.7 °C). Her blood pressure is 133/89 and her pulse is 111 (abnormal). Her respiration is 16 and oxygen saturation is 99%.     Chief Complaint: Allergic Reaction    19 y.o female c/o allergic reaction to peanuts with accidental contact 15 mins ago resulting in rash. She reports hives. Patient states that she took benadryl a few mins ago.     Allergic Reaction  This is a new problem. The current episode started today. The problem has been rapidly worsening since onset. The problem is severe. The patient was exposed to food. The exposure occurred at School. Associated symptoms include eye itching, eye watering, itching, a rash and trouble swallowing (resolved). Pertinent negatives include no chest pain, chest pressure, coughing, diarrhea, difficulty breathing, drooling, eye redness, skin blistering, stridor or wheezing. The treatment provided moderate relief. Her past medical history is significant for food allergies.       HENT:  Positive for trouble swallowing (resolved). Negative for drooling.    Cardiovascular:  Negative for chest pain.   Eyes:  Positive for eye itching. Negative for eye redness.   Respiratory:  Negative for cough, stridor and wheezing.    Gastrointestinal:  Negative for diarrhea.   Skin:  Positive for rash.   Allergic/Immunologic: Positive for food allergies.      Objective:     Vitals:    01/24/25 1105   BP: 133/89   BP Location: Left arm   Patient Position: Sitting   Pulse: (!) 111   Resp: 16   Temp: 98.1 °F (36.7 °C)   TempSrc: Oral   SpO2: 99%   Weight: 70.3 kg (155 lb)   Height: 5' 4" (1.626 m)      Physical Exam   Constitutional: She is oriented to person, place, and time.  Non-toxic appearance. She does not appear ill. No distress.   HENT:   Head: Atraumatic.   Ears:      Comments: No drooling  Mouth/Throat: Uvula is " midline and oropharynx is clear and moist. No trismus in the jaw. No uvula swelling. Tonsils are 0 on the right. Tonsils are 0 on the left. No tonsillar exudate.   Eyes: Conjunctivae are normal.   Cardiovascular: Regular rhythm, normal heart sounds and normal pulses. Tachycardia present.   Pulmonary/Chest: Effort normal and breath sounds normal.   Abdominal: There is no rebound.   Neurological: She is alert and oriented to person, place, and time.   Skin: Skin is not diaphoretic and rash (consitent with urticaria).   Psychiatric: Judgment and thought content normal.       Assessment:     1. Allergic reaction, initial encounter        Plan:       Allergic reaction, initial encounter  -     predniSONE tablet 80 mg- declines IM injection, able to swallow pills   -     predniSONE (DELTASONE) 50 MG Tab; Take 1 tablet (50 mg total) by mouth once daily. for 4 days  Dispense: 4 tablet; Refill: 0  -     EPINEPHrine (EPIPEN 2-RICKY) 0.3 mg/0.3 mL AtIn; Inject 0.6 mLs (0.6 mg total) into the muscle once. 0.3 mg subcutaneous/ IM X 1; may repeat dose X 1 after 5-15 min for anaphylactic reaction for 1 dose  Dispense: 0.6 mL; Refill: 0  -     cetirizine (ZYRTEC) 10 MG tablet; Take 1 tablet (10 mg total) by mouth once daily. for 7 days  Dispense: 7 tablet; Refill: 0      Patient Instructions   Seek immediate care in the emergency room in the event of severe drooling, abdominal pain, chest pain, respiratory distress, fever unresponsive to antipyretic, dehydration, loss of consciousness, seizure.    Allergic Reaction ED   General Information   You came to the Emergency Department (ED) for an allergic reaction. This is your bodys response to an allergen. Some people have a rash, hives, trouble breathing, or swelling. Others may throw up, feel dizzy, or pass out. This problem can be caused by things like:  Food.  Medicine.  Insect stings or bites.  Exercise.  Latex.  Triggers that cant be identified at the time.  Some people can come  in contact with these things and have no problems. But when you have an allergy to something, your body acts as if the substance is harming you. This causes symptoms.  What care is needed at home?   Call your regular doctor to let them know you were in the ED. Make a follow-up appointment if you were told to.  If you were told to see an allergist, ask your regular doctor for a referral.  If you were prescribed an epinephrine autoinjector, fill the prescription right away. Make sure you know how to use it.  Avoid the allergen if you know what caused your reaction. You may need to work with your regular doctor or an allergist to find what has caused your reaction. Then you can try to avoid it in the future.  Use a cool washcloth on your eyes or skin.  If possible, rest for the next few days.  Use over-the-counter medicines to help with your milder symptoms.  Use antihistamine eye drops to help with itching and hives.  When do I need to get emergency help?   Call an ambulance right away if:   You have signs of a severe reaction like:  You have trouble breathing, wheezing, or have a cough that wont stop.  You feel like your throat is closing or your lips or tongue are swelling.  You feel very weak like you are going to pass out, or actually pass out.  If you have signs of a severe allergic reaction, use your epinephrine autoinjector right away if you have one, then call for an ambulance.  When do I need to call the doctor?   You are not  having a severe reaction but do have other signs of an allergic reaction, such as:  You have a rash, skin redness, flushing, or hives.  Your skin itches.  You have belly pain or an upset stomach.  You are not feeling better in 2 to 3 days.  You have new or worsening symptoms.  Last Reviewed Date   2021-02-11  Consumer Information Use and Disclaimer   This information is not specific medical advice and does not replace information you receive from your health care provider. This is only  a brief summary of general information. It does NOT include all information about conditions, illnesses, injuries, tests, procedures, treatments, therapies, discharge instructions or life-style choices that may apply to you. You must talk with your health care provider for complete information about your health and treatment options. This information should not be used to decide whether or not to accept your health care providers advice, instructions or recommendations. Only your health care provider has the knowledge and training to provide advice that is right for you.  Copyright   Copyright © 2021 [employer] and its affiliates and/or licensors. All rights reserved.

## 2025-01-24 NOTE — PATIENT INSTRUCTIONS
Seek immediate care in the emergency room in the event of severe drooling, abdominal pain, chest pain, respiratory distress, fever unresponsive to antipyretic, dehydration, loss of consciousness, seizure.

## 2025-01-31 ENCOUNTER — ATHLETIC TRAINING SESSION (OUTPATIENT)
Dept: SPORTS MEDICINE | Facility: CLINIC | Age: 20
End: 2025-01-31
Payer: COMMERCIAL

## 2025-01-31 ENCOUNTER — ATHLETIC TRAINING SESSION (OUTPATIENT)
Dept: SPORTS MEDICINE | Facility: CLINIC | Age: 20
End: 2025-01-31

## 2025-01-31 DIAGNOSIS — Z00.00 HEALTHCARE MAINTENANCE: Primary | ICD-10-CM

## 2025-01-31 DIAGNOSIS — M25.571 RIGHT ANKLE PAIN, UNSPECIFIED CHRONICITY: Primary | ICD-10-CM

## 2025-02-01 NOTE — PROGRESS NOTES
Reason for Encounter New Injury    Subjective:       Chief Complaint: Dipti Saunders is a 19 y.o. female student at [employer] who had concerns including Injury, Swelling, and Pain of the Right Ankle.    Hasn't had a problem with her ankle until the game yesterday on 1/30 against [employer]. During the 4th quarter, Jluis drove to the basket and try to euro-step through the defenders and as she landed on her right foot, the ankle went into inversion and she fell straight to the ground; immediately grabbing her leg and rolling on the ground. She had to be carried off the court and into the locker room, she could not bear weight on it. Once we made it into the ATR, she had swelling along the lateral side of her right ankle and her pain was around the lateral malleolus and on top of the navicular. She states her pain scale  8/10. Her AROM was better up and down than side to side and same with MMT. I gave her ice, a compression sleeve and ROM exercises to do for the night and she refused the crutches but used a boot. Today  1/31 she reported, still limping in the boot and asking for the crutches. She states her pain scale was the same as yesterday and complained about barely being able to move her toes. Her AROM and MMT had no change ut her swelling did decrease a bit. Once we did some pain management with the complex, a milk massage with cajun cream and coca butter, she had better movement in her ankle. We also did ROM exercises and than finished treatment with e-stim and game ready while elevated.      No complaints or problems since after amelia break. Gets it taped as needed.    She hasn't complain about it since 11/19/2024 but comes to get treatment on it every so often especially on or before game days.     During Practice on 11/17/2024, as they were doing a lay-up drill, Gomez drove to the goal and when she went up for the shoot, she barely got off the ground and immediately fell to the ground. She walked it off and jumped  right back in practice. She finish practice without a problem. On 11/18/2024 she reports in the training room complaining of R ankle pain, She could barely walk on it and stated that pain scale is 7/10. I taped her up for practice but she only did shooting drills and walk-through and after practice she came and received treatment on it. On 11/19/224 she reports to morning treatments before the game feeling better that yesterday and ready to go full out. She played in the game without a problem..     Handedness: right-handed  Sport played: basketball      Level: college      Position:gaurd      Injury  This is a recurrent problem. The current episode started more than 1 month ago. The problem occurs intermittently. The problem has been rapidly worsening. The symptoms are aggravated by walking. She has tried ice and NSAIDs for the symptoms. The treatment provided moderate relief.   Swelling  This is a recurrent problem. The current episode started more than 1 month ago. The problem occurs intermittently. The problem has been gradually improving. The symptoms are aggravated by walking. She has tried ice and acetaminophen for the symptoms. The treatment provided moderate relief.   Pain  This is a recurrent problem. The current episode started more than 1 month ago. The problem occurs intermittently. The problem has been waxing and waning. The symptoms are aggravated by walking. She has tried ice for the symptoms. The treatment provided moderate relief.       ROS              Objective:       General: Dipti is well-developed, well-nourished, appears stated age, in no acute distress, alert and oriented to time, place and person.         General Musculoskeletal Exam   Gait: antalgic     Right Ankle/Foot Exam     Swelling   The patient is swollen on the anterior talofibular ligament and lateral malleolus.    Tenderness   The patient is tender to palpation of the ATF, lateral malleolus and lateral talar dome.    Pain    The patient exhibits pain of the anterior talofibular ligament, lateral malleolus and lateral talar dome.    Range of Motion   Ankle Joint   Dorsiflexion:  abnormal   Plantar flexion:  abnormal   Subtalar Joint   Inversion:  abnormal   Eversion:  abnormal   Lee Test:  negative  First MTP Joint: normal    Tests   Anterior drawer: positive  Heel Walk: unable to perform  Tiptoe Walk: unable to perform  Single Heel Rise: unable to perform  External Rotation Test: positive  Squeeze Test: positive    Other   Ankle Crepitus: absent  Foot Crepitus:  absent    Comments:  On the external dial test. It only hurts when I jammed the navicular into the distal parts of the tibial and fibula.     Left Ankle/Foot Exam   Left ankle exam is normal.      Muscle Strength   Right Lower Extremity   Anterior tibial:  2/5   Posterior tibial:  2/5   Gastrocsoleus:  2/5   Peroneal muscle:  2/5   FDL: 3/5 and 2/5  EDL: 3/5 and 2/5  FHL: 3/5 and 2/5  Left Lower Extremity   EHL:  3/5 and 2/5            Assessment:     Status: O - Out    Date Seen:  1/30/2025    Date of Injury:  1/30/2025    Date Out:  1/30/2025    Date Cleared:  N/A        Treatment/Rehab/Maintenance:   Dipti completed:    [x]  INJURY TREATMENT   []  MAINTENANCE  DATE OF SERVICE: 1/31/2025  INJURY/CONDITON: R ankle    Dipti received the selected modalities after being cleared for contradictions.  Dipti received education on potenital side effects of the selected modalities and agreed to treatment.      MODALITIES:    Cryotherapy / Thermotherapy Duration  (Mins) Add. Tx Parameters / Comment   []Cold Tub / Whirlpool (50-60 F)     []Contrast Bath (105-110 F & 50-65 F)     [x]Game Ready 20 W/ e-stim   []Hot Pack     []Hot Tub / Whirlpool ( F)     []Ice Massage     []Ice Pack     []Paraffin Wax (126-130 F)     []Vapocoolant Patten       Electrotherapy Waveform   (AC/DC) Modulation (Cont./Interrupted/Surged) Intensity   (V) Pulse Width/Dur.  (uS) Pulse  Rate/Freq.  (Hz, PPS or CPS) Duration  (Mins) Add. Tx Parameters / Comment   []Combo          [x]E-Stim - IFC DC Cont 26   20 W/ game ready   []E-Stim - Premod          []E-Stim - Armenian          []E-Stim - TENS          [x]E-Stim - Other DC Cont 12   20 Complex   []Iontophoresis        Meds:      Massage Duration  (Mins) Add. Tx Parameters / Comment   []Massage - IASTM     []Massage - Scar Tissue     []Massage - Self Administered     [x]Massage - Therapeutic 10 W/ cajun cream    []Myofascial Release        THERAPEUTIC EXERCISES:    Stretching Cardio Rehab Other   []Stretching - Active []Cardio - Bike [x]Rehab - Ankle/Foot []Agility []PNF   []Stretching - Dynamic []Cardio - Elliptical []Rehab - Knee []Balance [x]ROM - Active   []Stretching - Passive []Cardio - Jog/Run []Rehab - Hip []Blood Flow Restriction []ROM - Passive   []Stretching - PNF []Cardio - Treadmill []Rehab - Wrist/Hand []Foam Roller []RTP - Concussion Protocol   []Stretching - Static []Cardio - Upper Body Ergometer []Rehab - Elbow [x]Functional Exercises []RTP - Sport Specific    []Cardio - Walk []Rehab - Shoulder []Joint Mobilization []Strengthening Exercises     []Rehab - Neck/Spine [x]Manual Therapy []Other:     []Rehab - Back []Plyometric Exercises      []Rehab - Other       Exercise Reps/Sets/Time Weight #   ABCs Upper & lower case 2 x ea    Ankle pumps U&D 2x50    Ankle pumps StoS 2x 20    Ankle circles CW & CCW 2x15                                    [x]  INJURY TREATMENT   []  MAINTENANCE  DATE OF SERVICE: 1/30/2025  INJURY/CONDITON: R ankle    MODALITIES:    Cryotherapy / Thermotherapy Duration  (Mins) Add. Tx Parameters / Comment   []Cold Tub / Whirlpool (50-60 F)     []Contrast Bath (105-110 F & 50-65 F)     []Game Ready     []Hot Pack     []Hot Tub / Whirlpool ( F)     []Ice Massage     [x]Ice Pack 20    []Paraffin Wax (126-130 F)     []Vapocoolant Spray       Miscellaneous Add. Tx Parameters / Comment   []Compression Wrap     []Support Wrap    [x]Taping - Preventative Both ankles   []Taping - Injured Part    []Wound Care    []Other:      [x]  INJURY TREATMENT   []  MAINTENANCE  DATE OF SERVICE: 11/19/2024  INJURY/CONDITON: R ankle     Dipti received the selected modalities after being cleared for contradictions.  Dipti received education on potenital side effects of the selected modalities and agreed to treatment.    Ultrasound Duty Cycle   (%) Freq.  (Mhz) Intensity   (w/cm2) Duration  (Mins) Add. Tx Parameters / Comment   []Combo        []Phonophoresis     Meds:   [x]Ultrasound  50 3.3 1.0 7 W/ biofreeze & cajun cream   []Ultrasound and E-Stim            Miscellaneous Add. Tx Parameters / Comment   []Compression Wrap    []Support Wrap    [x]Taping - Preventative L ankle   [x]Taping - Injured Part R ankle   []Wound Care    []Other:      Comment:         [x]  INJURY TREATMENT   []  MAINTENANCE  DATE OF SERVICE: 11/18/2024  INJURY/CONDITON: R ankle    MODALITIES:    Cryotherapy / Thermotherapy Duration  (Mins) Add. Tx Parameters / Comment   []Cold Tub / Whirlpool (50-60 F)     []Contrast Bath (105-110 F & 50-65 F)     [x]Game Ready 20 W/e-stim   []Hot Pack     []Hot Tub / Whirlpool ( F)     []Ice Massage     []Ice Pack     []Paraffin Wax (126-130 F)     []Vapocoolant Spray        Comment:       Electrotherapy Waveform   (AC/DC) Modulation (Cont./Interrupted/Surged) Intensity   (V) Pulse Width/Dur.  (uS) Pulse Rate/Freq.  (Hz, PPS or CPS) Duration  (Mins) Add. Tx Parameters / Comment   []Combo          [x]E-Stim - IFC DC Cont 17   20 W/ gameready   []E-Stim - Premod          []E-Stim - Tongan          []E-Stim - TENS          []E-Stim - Other          []Iontophoresis        Meds:           Plan:       1. Will keep doing treatment over weekend. If doesn't better will refer to   2. Physician Referral: no  3. ED Referral:no  4. Parent/Guardian Notified: No  5. All questions were answered, ath. will contact me for questions  or concerns in  the interim.  6.         Eligible to use School Insurance: Yes

## 2025-02-01 NOTE — PROGRESS NOTES
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 who had concerns including Health Maintenance (Allergic reaction ).    On 1/24, Jluis reports to the ATR after a working and is complain of her legs hurting. After two minutes, she starts to itch all over and hands start to swell.and she states that she thinks she is having an allergic reaction. I immediately asked is she had her epi-pen which she stated that she left it back at home in [place]. I immediately gave her two benadryl's and started rubbing her down in hydrocortisone on her hands arms and legs. About 5 minutes after that she started complaining about her throat closing and started to have a little panic. Since the road and highways were still iced over and closed on of the coaches volunteer to bring her to an urgent care. As I walked her to the car outside she complained of having trouble breathing due to the cold air outside and than stated that she could not feel her legs. So me and the  pick her up and carried her to the car. Before they made it to the urgent care, she started to feel better and breathing got back to normal. The doctor there prescribe her to epi-pens, so I now have on in my kit and also some pills to help too.            Review of Systems   Respiratory:  Positive for shortness of breath.    Skin:  Positive for flushing, itching and rash.                 Objective:       General: Dipti is well-developed, well-nourished, appears stated age, in no acute distress, alert and oriented to time, place and person.     AT Session          Assessment:     Status: F - Full Participation    Date Seen:  1/24/2025    Date of Injury:  1/24/2025    Date Out:  1/24/2025    Date Cleared:  1/24/2025        Treatment/Rehab/Maintenance:           Plan:       1. Return to play as normal. Has new epi-pens now.  2. Physician Referral: no  3. ED Referral:yes  4. Parent/Guardian Notified:  No  5. All questions were answered, ath. will contact me for questions or concerns in  the interim.  6.         Eligible to use School Insurance: No, not a school related injury

## 2025-02-18 DIAGNOSIS — M25.532 LEFT WRIST PAIN: ICD-10-CM

## 2025-02-18 DIAGNOSIS — M79.644 PAIN OF RIGHT THUMB: ICD-10-CM

## 2025-07-07 ENCOUNTER — OFFICE VISIT (OUTPATIENT)
Dept: URGENT CARE | Facility: CLINIC | Age: 20
End: 2025-07-07
Payer: COMMERCIAL

## 2025-07-07 VITALS
DIASTOLIC BLOOD PRESSURE: 58 MMHG | TEMPERATURE: 98 F | BODY MASS INDEX: 26.46 KG/M2 | SYSTOLIC BLOOD PRESSURE: 112 MMHG | OXYGEN SATURATION: 99 % | RESPIRATION RATE: 14 BRPM | HEIGHT: 64 IN | WEIGHT: 155 LBS | HEART RATE: 67 BPM

## 2025-07-07 DIAGNOSIS — T78.40XA ALLERGIC REACTION, INITIAL ENCOUNTER: Primary | ICD-10-CM

## 2025-07-07 PROCEDURE — 99214 OFFICE O/P EST MOD 30 MIN: CPT | Mod: S$GLB,,, | Performed by: NURSE PRACTITIONER

## 2025-07-07 RX ORDER — PREDNISONE 20 MG/1
80 TABLET ORAL
Status: COMPLETED | OUTPATIENT
Start: 2025-07-07 | End: 2025-07-07

## 2025-07-07 RX ORDER — CETIRIZINE HYDROCHLORIDE 10 MG/1
10 TABLET ORAL DAILY
Qty: 7 TABLET | Refills: 0 | Status: SHIPPED | OUTPATIENT
Start: 2025-07-07 | End: 2025-07-14

## 2025-07-07 RX ORDER — PREDNISONE 20 MG/1
40 TABLET ORAL DAILY
Qty: 8 TABLET | Refills: 0 | Status: SHIPPED | OUTPATIENT
Start: 2025-07-08 | End: 2025-07-12

## 2025-07-07 RX ADMIN — PREDNISONE 80 MG: 20 TABLET ORAL at 04:07

## 2025-07-07 NOTE — PROGRESS NOTES
"Subjective:      Patient ID: Dipti Saunders is a 19 y.o. female.    Vitals:  height is 5' 4" (1.626 m) and weight is 70.3 kg (155 lb). Her tympanic temperature is 97.5 °F (36.4 °C). Her blood pressure is 112/58 (abnormal) and her pulse is 67. Her respiration is 14 and oxygen saturation is 99%.     Chief Complaint: Allergic Reaction    18 y/o female c/o with a allergic reaction.  felt like  trouble swallowing and had to come in asap.   Provider note below:  This is a 19 y.o. female who presents today with a chief complaint of generalized itching secondary to allergic reaction and felt like trouble swallowing secondary to eating the cake earlier with nuts in it, patient with history of peanut allergy, denies throat closing, denies any rash, patient was immediately brought back to the clinic room and examined by me, patient refused any IM injection, denies fever, body aches or chills, denies cough, wheezing or shortness of breath, denies nausea, vomiting, diarrhea or abdominal pain, denies chest pain or dizziness positional lightheadedness, denies sore throat or trouble swallowing, denies loss of taste or smell, or any other symptoms  Patient accompanied by friend in clinic       Allergic Reaction  This is a new problem. The current episode started today. The problem has been gradually worsening since onset. The problem is severe. The exposure occurred at Home. Associated symptoms include itching and trouble swallowing (resolved). Pertinent negatives include no abdominal pain, chest pain, chest pressure, coughing, diarrhea, difficulty breathing, drooling, eye itching, eye redness, eye watering, globus sensation, hyperventilation, rash, skin blistering, stridor, vomiting or wheezing. Past treatments include one or more OTC medications. The treatment provided no relief. Her past medical history is significant for food allergies. There is no history of asthma, atopic dermatitis, medication allergies or seasonal allergies. "       HENT:  Positive for trouble swallowing (resolved). Negative for drooling.    Cardiovascular:  Negative for chest pain.   Eyes:  Negative for eye itching and eye redness.   Respiratory:  Negative for cough, stridor and wheezing.    Gastrointestinal:  Negative for abdominal pain, vomiting and diarrhea.   Skin:  Negative for rash.        Generalized itching   Allergic/Immunologic: Positive for food allergies. Negative for seasonal allergies.      Objective:     Physical Exam   Constitutional: She is oriented to person, place, and time. She appears well-developed. She is cooperative.  Non-toxic appearance. She does not appear ill. No distress.      Comments:Patient sitting comfortably on the exam table, non toxic appearance  and answering questions appropriately, no acute distress, talking in full sentences without pause     HENT:   Head: Normocephalic and atraumatic.   Ears:   Right Ear: Hearing, tympanic membrane, external ear and ear canal normal.   Left Ear: Hearing, tympanic membrane, external ear and ear canal normal.   Nose: Nose normal. No mucosal edema, rhinorrhea or nasal deformity. No epistaxis. Right sinus exhibits no maxillary sinus tenderness and no frontal sinus tenderness. Left sinus exhibits no maxillary sinus tenderness and no frontal sinus tenderness.   Mouth/Throat: Uvula is midline, oropharynx is clear and moist and mucous membranes are normal. No trismus in the jaw. Normal dentition. No uvula swelling. No oropharyngeal exudate, posterior oropharyngeal edema, posterior oropharyngeal erythema, tonsillar abscesses or cobblestoning.   No oropharyngeal edema or erythema or exudate noted, uvula midline, able to tolerate secretions, no drooling noted      Comments: No oropharyngeal edema or erythema or exudate noted, uvula midline, able to tolerate secretions, no drooling noted  Eyes: Conjunctivae and lids are normal. No scleral icterus.   Neck: Trachea normal and phonation normal. Neck supple. No  edema present. No erythema present. No neck rigidity present.   Cardiovascular: Normal rate, regular rhythm, normal heart sounds and normal pulses.   Pulmonary/Chest: Effort normal and breath sounds normal. No stridor. No respiratory distress. She has no decreased breath sounds. She has no wheezes. She has no rhonchi. She has no rales.   Abdominal: Normal appearance.   Musculoskeletal: Normal range of motion.         General: No deformity. Normal range of motion.   Neurological: She is alert and oriented to person, place, and time. She exhibits normal muscle tone. Coordination normal.   Skin: Skin is warm, dry, intact, not diaphoretic, not pale and no rash.   Psychiatric: Her speech is normal and behavior is normal. Judgment and thought content normal.   Nursing note and vitals reviewed.        Patient in no acute distress.  Vitals reassuring.  Discussed results/diagnosis/plan in depth with patient in clinic. Strict precautions given to patient to monitor for worsening signs and symptoms. Advised to follow up with primary.All questions answered. Strict ER precautions given. If your symptoms worsens or fail to improve you should go to the Emergency Room. Discharge and follow-up instructions given verbally/printed. Discharge and follow-up instructions discussed with the patient who expressed understanding and willingness to comply with my recommendations.Patient voiced understanding and in agreement with current treatment plan.     Please be advised this text was dictated with [employer] software and may contain errors due to translation.   Assessment:     1. Allergic reaction, initial encounter        Plan:       Allergic reaction, initial encounter  -     predniSONE tablet 80 mg  -     cetirizine (ZYRTEC) 10 MG tablet; Take 1 tablet (10 mg total) by mouth once daily. for 7 days  Dispense: 7 tablet; Refill: 0  -     predniSONE (DELTASONE) 20 MG tablet; Take 2 tablets (40 mg total) by mouth once daily. for 4 days   Dispense: 8 tablet; Refill: 0          Medical Decision Making:   Urgent Care Management:  Patient in no acute distress.  Vitals reassuring.  On exam, patient is nontoxic appearing and afebrile.  Lungs CTA.  Patient initially reported trouble swallowing secondary to allergic reaction due to eating cake with nuts.  Patient with history of allergic reaction to peanuts.  Patient was immediately brought back to the room and examined by me.  IM injection discussed, however patient refused any injection.  Physical examination with no oropharyngeal edema, erythema or exudate noted uvula midline, no drooling noted.  Pulse ox 99%.  Lungs CTA.  Patient talking in full sentences without pause.  Patient got accompanied by a friend in clinic.  Since patient refused the IM injection I did order 80 mg of oral prednisone.  Patient able to swallow and drink liquid fine.  I did discussed with patient the importance of carrying EpiPen secondary to history of allergic reaction with peanuts.  Patient reports she does have 2 EpiPens with her but does not like injections.  Patient refused the new prescription of EpiPen if needed.  Will prescribe her oral prednisone with detailed education provided about strict precautions.  Re-evaluation with patient feeling much better.  Patient discharge with friend.  Medication prescribed and over-the-counter medication discussed with patient at length.  Proper hydration advised.  I reiterated the importance of further evaluation if no improvement symptoms and follow-up with primary. Patient voiced understanding and in agreement with current treatment plan.             Patient Instructions   PLEASE READ YOUR DISCHARGE INSTRUCTIONS ENTIRELY AS IT CONTAINS IMPORTANT INFORMATION.      Please drink plenty of fluids.  Please get plenty of rest.  Please return here or go to the Emergency Department for any concerns or worsening of condition (worsening rash, difficulty swallowing, shortness of breath, passing  out).    If you were prescribed a narcotic medication, do not drive or operate heavy equipment or machinery while taking these medications.    Please take over the counter Zantac as directed for the next 24-72hours as needed.    If you were given a steroid shot in the clinic and have also been given a prescription for a steroid such as Prednisone or a Medrol Dose Pack, please begin taking them tomorrow.    If you have a localized reaction it is ok to apply OTC  topical creams  as directed to the affected area.    Please take an over the counter antihistamine medication (allegra/Claritin/Zyrtec) of your choice as directed.  Benadryl at night - may make you drowsy do not drive after    Please follow up with your primary care doctor or specialist as needed.    If you  smoke, please stop smoking.    Please arrange follow up with your primary medical clinic as soon as possible. You must understand that you've received an Urgent Care treatment only and that you may be released before all of your medical problems are known or treated. You, the patient, will arrange for follow up as instructed. If your symptoms worsen or fail to improve you should go to the Emergency Room.

## 2025-07-24 ENCOUNTER — TELEPHONE (OUTPATIENT)
Dept: URGENT CARE | Facility: CLINIC | Age: 20
End: 2025-07-24
Payer: COMMERCIAL

## 2025-07-24 NOTE — LETTER
July 24, 2025      [facility] [address] [facility] [address]  Phone: [phone]  Fax: [phone]       Patient: Dipti Saunders   YOB: 2005  Date of Visit: 07/24/2025    To Whom It May Concern:    Elisa Saunders  was at [facility] on 07/07/2025 The patient may return to work/school on 07/09/2025 with no restrictions. If you have any questions or concerns, or if I can be of further assistance, please do not hesitate to contact me.    Sincerely,      Laverne Tariq PA-C

## 2025-07-31 ENCOUNTER — ATHLETIC TRAINING SESSION (OUTPATIENT)
Dept: SPORTS MEDICINE | Facility: CLINIC | Age: 20
End: 2025-07-31
Payer: COMMERCIAL

## 2025-07-31 DIAGNOSIS — Z00.00 HEALTHCARE MAINTENANCE: Primary | ICD-10-CM

## 2025-07-31 NOTE — PROGRESS NOTES
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    Dipti presents in the training room on 6/23/20 and 6/25/2025 complaining of lower body tightness and soreness. She came in to get treatments to stay on top of her body.     Handedness: right-handed  Sport played: basketball      Level: college      Position:guard      Pain  This is a new problem. The current episode started more than 1 month ago. The problem occurs rarely. The problem has been resolved. She has tried ice for the symptoms. The treatment provided significant relief.       ROS              Objective:       General: Dipti is well-developed, well-nourished, appears stated age, in no acute distress, alert and oriented to time, place and person.     AT Session          Assessment:     Status: F - Full Participation    Date Seen: 6/23/2025    Date of Injury: 6/23/2025    Date Out: N/A    Date Cleared: 6/23/2025        Treatment/Rehab/Maintenance:   Dipti completed:    []  INJURY TREATMENT   [x]  MAINTENANCE  DATE OF SERVICE: 6/25/2025  INJURY/CONDITON: Lower body    Dipti received the selected modalities after being cleared for contradictions.  Dipti received education on potenital side effects of the selected modalities and agreed to treatment.      MODALITIES:    Cryotherapy / Thermotherapy Duration  (Mins) Add. Tx Parameters / Comment   []Cold Tub / Whirlpool (50-60 F)     []Contrast Bath (105-110 F & 50-65 F)     [x]Game Ready 20    []Hot Pack     []Hot Tub / Whirlpool ( F)     []Ice Massage     []Ice Pack     []Paraffin Wax (126-130 F)     []Vapocoolant [place]       []  INJURY TREATMENT   [x]  MAINTENANCE  DATE OF SERVICE: 6/23/2025  INJURY/CONDITON: Lower body    Other  Modalities Duration  (Mins)  Add. Tx Parameters / Comment   []Active Release     []Cupping     []Dry Needling     [x]Intermittent Compression  30    []Laser     []Lightwave     []Traction      []Other:       Comment:  Recovery boots    Plan:       1. Treatments as needed  2. Physician Referral: no  3. ED Referral:no  4. Parent/Guardian Notified: No  5. All questions were answered, ath. will contact me for questions or concerns in  the interim.  6.         Eligible to use School Insurance: Yes

## 2025-08-12 ENCOUNTER — ATHLETIC TRAINING SESSION (OUTPATIENT)
Dept: SPORTS MEDICINE | Facility: CLINIC | Age: 20
End: 2025-08-12
Payer: COMMERCIAL

## 2025-08-12 DIAGNOSIS — Z00.00 HEALTHCARE MAINTENANCE: Primary | ICD-10-CM
